# Patient Record
Sex: MALE | Race: WHITE | NOT HISPANIC OR LATINO | Employment: FULL TIME | URBAN - NONMETROPOLITAN AREA
[De-identification: names, ages, dates, MRNs, and addresses within clinical notes are randomized per-mention and may not be internally consistent; named-entity substitution may affect disease eponyms.]

---
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
## 2022-07-20 ENCOUNTER — OFFICE VISIT (OUTPATIENT)
Dept: INTERNAL MEDICINE | Facility: CLINIC | Age: 22
End: 2022-07-20

## 2022-07-20 VITALS
BODY MASS INDEX: 26.94 KG/M2 | SYSTOLIC BLOOD PRESSURE: 100 MMHG | DIASTOLIC BLOOD PRESSURE: 74 MMHG | OXYGEN SATURATION: 98 % | HEART RATE: 68 BPM | WEIGHT: 192.4 LBS | HEIGHT: 71 IN | RESPIRATION RATE: 18 BRPM | TEMPERATURE: 98.7 F

## 2022-07-20 DIAGNOSIS — F39 MOOD DISORDER: Primary | ICD-10-CM

## 2022-07-20 DIAGNOSIS — R53.83 FATIGUE, UNSPECIFIED TYPE: ICD-10-CM

## 2022-07-20 DIAGNOSIS — Z91.89 ENCOUNTER FOR HEPATITIS C VIRUS SCREENING TEST FOR HIGH RISK PATIENT: ICD-10-CM

## 2022-07-20 DIAGNOSIS — B35.1 NAIL FUNGUS: ICD-10-CM

## 2022-07-20 DIAGNOSIS — Z11.59 ENCOUNTER FOR HEPATITIS C VIRUS SCREENING TEST FOR HIGH RISK PATIENT: ICD-10-CM

## 2022-07-20 DIAGNOSIS — Z13.220 SCREENING FOR HYPERLIPIDEMIA: ICD-10-CM

## 2022-07-20 PROCEDURE — 99204 OFFICE O/P NEW MOD 45 MIN: CPT

## 2022-07-20 RX ORDER — LAMOTRIGINE 100 MG/1
100 TABLET ORAL DAILY
Qty: 90 TABLET | Refills: 0 | Status: SHIPPED | OUTPATIENT
Start: 2022-07-20

## 2022-07-20 RX ORDER — FLUCONAZOLE 150 MG/1
150 TABLET ORAL DAILY
Qty: 2 TABLET | Refills: 0 | Status: SHIPPED | OUTPATIENT
Start: 2022-07-20

## 2022-07-20 RX ORDER — CLOTRIMAZOLE 1 G/ML
SOLUTION TOPICAL 2 TIMES DAILY
Qty: 15 ML | Refills: 1 | Status: SHIPPED | OUTPATIENT
Start: 2022-07-20

## 2022-07-20 NOTE — PROGRESS NOTES
"        Subjective     Chief Complaint   Patient presents with   • Depression     Establishing care. No previous medication. Interested in Adyenight.    • Nail Problem     Right great toe, right pinky toe. Left pinky toe.        History of Present Illness  Patient is a 22 year old male who presents today with complaints of depression. States he is significant struggling with depression. States he is either really high or really low. Reports that sometimes his decision making is impulsive. States \"every little thing will send him off.\" States has not been on any medications in the past for this issue, but his sister, whom is present with him during exam, advised that his whole family has discussed that they feel this is something that would be very beneficial to patient, and patient agrees with this.  Reports he does struggle with chronic fatigue, and has not had labs done in quite some time. Denies any known medical history.   Reports has chronic athletes foot and what he has been told is nail fungus.  Has been treated in the past, and states that it has not helped and was told that he would probably have to have his right great toenail removed.  He states that he is trying to avoid and would like to try medical management again.  States the nail fungus is on his right and right great toe, right pinky toe, and left pinky toe.  Denies any pain or issues with this, however, states it is starting to just be more irritating that he is unable to [facility].      Patient's PMR from outside medical facility reviewed and noted.    Review of Systems   Constitutional: Negative for activity change, chills, fatigue and unexpected weight change.   HENT: Negative for congestion, ear pain, mouth sores, sore throat and trouble swallowing.    Eyes: Negative for discharge and visual disturbance.   Respiratory: Negative for cough and shortness of breath.    Cardiovascular: Negative for chest pain and leg swelling.   Gastrointestinal: " Negative for abdominal pain, constipation, diarrhea and nausea.   Genitourinary: Negative for decreased urine volume, difficulty urinating, flank pain and hematuria.   Musculoskeletal: Negative for back pain and gait problem.   Skin: Negative for color change and rash.   Allergic/Immunologic: Negative for environmental allergies and immunocompromised state.   Neurological: Negative for speech difficulty, weakness and headaches.   Psychiatric/Behavioral: Positive for agitation and dysphoric mood. Negative for confusion and sleep disturbance. The patient is nervous/anxious.         Otherwise complete ROS reviewed and negative except as mentioned in the HPI.    Past Medical History: History reviewed. No pertinent past medical history.  Past Surgical History:  Past Surgical History:   Procedure Laterality Date   • ADENOIDECTOMY     • SHOULDER ACROMIOPLASTY Left    • TONSILLECTOMY       Social History:  reports that he has never smoked. He has never used smokeless tobacco. He reports that he does not drink alcohol and does not use drugs.    Family History: family history includes Cancer in his paternal grandmother.      Allergies:  No Known Allergies  Medications:  Prior to Admission medications    Medication Sig Start Date End Date Taking? Authorizing Provider   ofloxacin (Ocuflox) 0.3 % ophthalmic solution Administer 1 drop to the right eye 4 (Four) Times a Day. For five days 8/26/20 7/20/22  Panda Brown MD       MANUEL:        PHQ-9 Depression Screening  Little interest or pleasure in doing things? 1-->several days   Feeling down, depressed, or hopeless? 3-->nearly every day   Trouble falling or staying asleep, or sleeping too much? 3-->nearly every day   Feeling tired or having little energy? 3-->nearly every day   Poor appetite or overeating? 1-->several days   Feeling bad about yourself - or that you are a failure or have let yourself or your family down? 2-->more than half the days   Trouble concentrating  "on things, such as reading the newspaper or watching television? 1-->several days   Moving or speaking so slowly that other people could have noticed? Or the opposite - being so fidgety or restless that you have been moving around a lot more than usual? 1-->several days   Thoughts that you would be better off dead, or of hurting yourself in some way? 0-->not at all   PHQ-9 Total Score 15   If you checked off any problems, how difficult have these problems made it for you to do your work, take care of things at home, or get along with other people? very difficult       PHQ-9 Total Score: 15   15-19 (Moderately Severe Depression)  Recommended starting psychotherapy/counseling, Discussed antidepressant therapy and gene sight testing    Objective     Vital Signs: /74 (BP Location: Left arm, Patient Position: Sitting, Cuff Size: Adult)   Pulse 68   Temp 98.7 °F (37.1 °C) (Skin)   Resp 18   Ht 180.3 cm (71\")   Wt 87.3 kg (192 lb 6.4 oz)   SpO2 98%   BMI 26.83 kg/m²   Physical Exam  Constitutional:       General: He is not in acute distress.     Appearance: Normal appearance. He is normal weight. He is not ill-appearing.   HENT:      Head: Normocephalic and atraumatic.      Nose: Nose normal.      Mouth/Throat:      Mouth: Mucous membranes are moist.      Pharynx: No posterior oropharyngeal erythema.   Eyes:      General: No scleral icterus.     Extraocular Movements: Extraocular movements intact.      Conjunctiva/sclera: Conjunctivae normal.      Pupils: Pupils are equal, round, and reactive to light.   Cardiovascular:      Rate and Rhythm: Normal rate and regular rhythm.      Pulses: Normal pulses.      Heart sounds: Normal heart sounds.   Pulmonary:      Effort: Pulmonary effort is normal. No respiratory distress.      Breath sounds: Normal breath sounds. No wheezing.   Abdominal:      General: Abdomen is flat. Bowel sounds are normal.      Palpations: Abdomen is soft.      Tenderness: There is no abdominal " tenderness.   Musculoskeletal:         General: Normal range of motion.      Cervical back: Normal range of motion.      Right lower leg: No edema.      Left lower leg: No edema.        Feet:    Feet:      Right foot:      Toenail Condition: Fungal disease present.     Left foot:      Toenail Condition: Fungal disease present.  Skin:     General: Skin is warm and dry.      Findings: No erythema or rash.   Neurological:      General: No focal deficit present.      Mental Status: He is alert and oriented to person, place, and time. Mental status is at baseline.      Motor: No weakness.   Psychiatric:         Mood and Affect: Mood normal.         Behavior: Behavior normal.         Thought Content: Thought content normal.         Judgment: Judgment normal.         BMI is >= 25 and <30. (Overweight) The following options were offered after discussion;: exercise counseling/recommendations and nutrition counseling/recommendations      Results Reviewed:  No results found for: GLUCOSE, BUN, CREATININE, NA, K, CL, CO2, CALCIUM, ALT, AST, WBC, HCT, PLT, CHOL, TRIG, HDL, LDL, LDLHDL, HGBA1C      Assessment / Plan     Assessment/Plan:  1. Mood disorder (HCC)  - lamoTRIgine (LaMICtal) 100 MG tablet; Take 1 tablet by mouth Daily.  Dispense: 90 tablet; Refill: 0  - GeneSight - Swab,    2. Fatigue, unspecified typ  - CBC & Differential  - Comprehensive metabolic panel  - Testosterone  - TSH  - T4    3. Screening for hyperlipidemia  - Lipid Panel    4. encounter for hepatitis C virus screening test for high risk patient  - Hepatitis C antibody    5. Nail fungus  - clotrimazole (LOTRIMIN) 1 % external solution; Apply  topically to the appropriate area as directed 2 (Two) Times a Day.  Dispense: 15 mL; Refill: 1  - fluconazole (Diflucan) 150 MG tablet; Take 1 tablet by mouth Daily.  Dispense: 2 tablet; Refill: 0        Return in about 2 weeks (around 8/3/2022) for gene sight. unless patient needs to be seen sooner or acute issues  arise.      I have discussed the patient results/orders and and plan/recommendation with them at today's visit.      Anne Schaeffer, APRN   07/20/2022

## 2022-07-22 ENCOUNTER — TELEPHONE (OUTPATIENT)
Dept: INTERNAL MEDICINE | Facility: CLINIC | Age: 22
End: 2022-07-22

## 2022-07-22 LAB
ALBUMIN SERPL-MCNC: 5 G/DL (ref 4.1–5.2)
ALBUMIN/GLOB SERPL: 2.5 {RATIO} (ref 1.2–2.2)
ALP SERPL-CCNC: 85 IU/L (ref 44–121)
ALT SERPL-CCNC: 13 IU/L (ref 0–44)
AST SERPL-CCNC: 17 IU/L (ref 0–40)
BASOPHILS # BLD AUTO: 0.1 X10E3/UL (ref 0–0.2)
BASOPHILS NFR BLD AUTO: 1 %
BILIRUB SERPL-MCNC: 0.7 MG/DL (ref 0–1.2)
BUN SERPL-MCNC: 12 MG/DL (ref 6–20)
BUN/CREAT SERPL: 11 (ref 9–20)
CALCIUM SERPL-MCNC: 9.8 MG/DL (ref 8.7–10.2)
CHLORIDE SERPL-SCNC: 104 MMOL/L (ref 96–106)
CHOLEST SERPL-MCNC: 133 MG/DL (ref 100–199)
CO2 SERPL-SCNC: 22 MMOL/L (ref 20–29)
CREAT SERPL-MCNC: 1.13 MG/DL (ref 0.76–1.27)
EGFRCR SERPLBLD CKD-EPI 2021: 95 ML/MIN/1.73
EOSINOPHIL # BLD AUTO: 0.1 X10E3/UL (ref 0–0.4)
EOSINOPHIL NFR BLD AUTO: 2 %
ERYTHROCYTE [DISTWIDTH] IN BLOOD BY AUTOMATED COUNT: 13 % (ref 11.6–15.4)
GLOBULIN SER CALC-MCNC: 2 G/DL (ref 1.5–4.5)
GLUCOSE SERPL-MCNC: 85 MG/DL (ref 65–99)
HCT VFR BLD AUTO: 50.2 % (ref 37.5–51)
HCV AB S/CO SERPL IA: <0.1 S/CO RATIO (ref 0–0.9)
HDLC SERPL-MCNC: 45 MG/DL
HGB BLD-MCNC: 16.4 G/DL (ref 13–17.7)
IMM GRANULOCYTES # BLD AUTO: 0 X10E3/UL (ref 0–0.1)
IMM GRANULOCYTES NFR BLD AUTO: 0 %
LDLC SERPL CALC-MCNC: 76 MG/DL (ref 0–99)
LYMPHOCYTES # BLD AUTO: 2 X10E3/UL (ref 0.7–3.1)
LYMPHOCYTES NFR BLD AUTO: 38 %
MCH RBC QN AUTO: 30.4 PG (ref 26.6–33)
MCHC RBC AUTO-ENTMCNC: 32.7 G/DL (ref 31.5–35.7)
MCV RBC AUTO: 93 FL (ref 79–97)
MONOCYTES # BLD AUTO: 0.5 X10E3/UL (ref 0.1–0.9)
MONOCYTES NFR BLD AUTO: 10 %
NEUTROPHILS # BLD AUTO: 2.6 X10E3/UL (ref 1.4–7)
NEUTROPHILS NFR BLD AUTO: 49 %
PLATELET # BLD AUTO: 250 X10E3/UL (ref 150–450)
POTASSIUM SERPL-SCNC: 4.3 MMOL/L (ref 3.5–5.2)
PROT SERPL-MCNC: 7 G/DL (ref 6–8.5)
RBC # BLD AUTO: 5.39 X10E6/UL (ref 4.14–5.8)
SODIUM SERPL-SCNC: 141 MMOL/L (ref 134–144)
T4 SERPL-MCNC: 7.3 UG/DL (ref 4.5–12)
TESTOST SERPL-MCNC: 609 NG/DL (ref 264–916)
TRIGL SERPL-MCNC: 53 MG/DL (ref 0–149)
TSH SERPL DL<=0.005 MIU/L-ACNC: 1.29 UIU/ML (ref 0.45–4.5)
VLDLC SERPL CALC-MCNC: 12 MG/DL (ref 5–40)
WBC # BLD AUTO: 5.4 X10E3/UL (ref 3.4–10.8)

## 2022-07-22 NOTE — TELEPHONE ENCOUNTER
Called patient discussed lab work.  No answer left voicemail stating that labs are within normal limits and if he had any questions or concerns to please return phone call to office.

## 2024-03-22 ENCOUNTER — TRANSCRIBE ORDERS (OUTPATIENT)
Dept: ADMINISTRATIVE | Facility: HOSPITAL | Age: 24
End: 2024-03-22
Payer: COMMERCIAL

## 2024-03-22 ENCOUNTER — LAB (OUTPATIENT)
Dept: LAB | Facility: HOSPITAL | Age: 24
End: 2024-03-22
Payer: COMMERCIAL

## 2024-03-22 DIAGNOSIS — N46.9 INFERTILITY MALE: Primary | ICD-10-CM

## 2024-03-22 DIAGNOSIS — N46.9 INFERTILITY MALE: ICD-10-CM

## 2024-03-22 LAB
CHARACTER SMN: NORMAL
COLLECTION METHOD SMN: ABNORMAL
EPI CELLS #/AREA SMN HPF: ABNORMAL /HPF
LIQUEFACTION TIME SMN: ABNORMAL MIN
NON PROGRESSIVE MOTILITY: 13.2 %
RBC #/AREA SMN HPF: ABNORMAL /HPF
SEX ABSTIN DURATION TIME PATIENT: 2 DAYS
SLOW PROGRESSIVE MOTILITY: 3.4 %
SMI: 18 (ref 80–400)
SPEC CONTAINER SPEC: ABNORMAL
SPECIMEN VOL SMN: 2 ML
SPERM # SMN: 37.9 MILLIONS/ML
SPERM IMMOTILE NFR SMN: 81.3 %
SPERM MOTILE NFR SMN: 18.7 % MOTILE (ref 50–100)
SPERM PROG NFR SMN: 2.1 % (ref 25–100)
SPERM SMN: 2.9 % NORMAL (ref 15–100)
VISC SMN: NORMAL CP
WBC SMN QL: ABNORMAL /HPF
WHO STANDARD: ABNORMAL

## 2024-03-22 PROCEDURE — 89320 SEMEN ANAL VOL/COUNT/MOT: CPT

## 2024-09-25 ENCOUNTER — APPOINTMENT (OUTPATIENT)
Dept: CT IMAGING | Facility: HOSPITAL | Age: 24
End: 2024-09-25
Payer: COMMERCIAL

## 2024-09-25 ENCOUNTER — HOSPITAL ENCOUNTER (EMERGENCY)
Facility: HOSPITAL | Age: 24
Discharge: HOME | End: 2024-09-25
Admitting: EMERGENCY MEDICINE
Payer: COMMERCIAL

## 2024-09-25 VITALS
SYSTOLIC BLOOD PRESSURE: 128 MMHG | HEART RATE: 69 BPM | OXYGEN SATURATION: 98 % | HEIGHT: 71 IN | TEMPERATURE: 97.9 F | WEIGHT: 222 LBS | BODY MASS INDEX: 31.08 KG/M2 | RESPIRATION RATE: 18 BRPM | DIASTOLIC BLOOD PRESSURE: 79 MMHG

## 2024-09-25 DIAGNOSIS — R68.84 JAW PAIN: Primary | ICD-10-CM

## 2024-09-25 PROCEDURE — 70486 CT MAXILLOFACIAL W/O DYE: CPT

## 2024-09-25 PROCEDURE — 96372 THER/PROPH/DIAG INJ SC/IM: CPT

## 2024-09-25 PROCEDURE — 25010000002 KETOROLAC TROMETHAMINE PER 15 MG: Performed by: PHYSICIAN ASSISTANT

## 2024-09-25 PROCEDURE — 99284 EMERGENCY DEPT VISIT MOD MDM: CPT

## 2024-09-25 RX ORDER — KETOROLAC TROMETHAMINE 30 MG/ML
30 INJECTION, SOLUTION INTRAMUSCULAR; INTRAVENOUS ONCE
Status: COMPLETED | OUTPATIENT
Start: 2024-09-25 | End: 2024-09-25

## 2024-09-25 RX ORDER — METHOCARBAMOL 750 MG/1
1500 TABLET, FILM COATED ORAL ONCE
Status: COMPLETED | OUTPATIENT
Start: 2024-09-25 | End: 2024-09-25

## 2024-09-25 RX ORDER — KETOROLAC TROMETHAMINE 10 MG/1
10 TABLET, FILM COATED ORAL EVERY 6 HOURS PRN
Qty: 12 TABLET | Refills: 0 | Status: SHIPPED | OUTPATIENT
Start: 2024-09-25

## 2024-09-25 RX ADMIN — KETOROLAC TROMETHAMINE 30 MG: 30 INJECTION, SOLUTION INTRAMUSCULAR; INTRAVENOUS at 11:12

## 2024-09-25 RX ADMIN — METHOCARBAMOL TABLETS 1500 MG: 750 TABLET, COATED ORAL at 11:12

## 2024-09-25 NOTE — Clinical Note
[facility] EMERGENCY DEPARTMENT  [address]  [facility] [address]  Phone: [phone]    Edi Melo was seen and treated in our emergency department on 9/25/2024.  He may return to work on 09/30/2024.         Thank you for choosing [facility].    Jagdish Verdin PA-C

## 2024-09-25 NOTE — ED PROVIDER NOTES
Subjective   History of Present Illness    Patient is a 24-year-old male presenting to ED with jaw pain and stiffness. PMH significant for tonsillectomy, adenoidectomy.  Patient states approximately 11 days ago he woke up with right sided jaw pain, mild swelling, and inability to fully open the right side of his jaw.  Patient states that in the days leading up he had had no injuries, had not eaten any particularly tough food, had no problems chewing or any dental problems.  Patient states that since that day his symptoms have progressively worsened to the point that today it was the least amount he could open his jaw and the most swelling on the right side of his face.  Patient tried applying ice this morning for which the swelling is improving how ever he still cannot fully open the jaw.  Patient denies fevers, chills, diaphoresis, drooling, difficulty swallowing, left-sided pain.  Patient denies any known history of TMJ.  Patient states he has had his right sided wisdom teeth as well as a right upper molar removed for an unknown reason but otherwise denies any oral problems.  Patient denies any history of jaw fractures.  Patient denies use of any medications prior to arrival and presents at this time for further evaluation.    Records reviewed show patient last seen the ED on 10/8/2021 for puncture wound of right thigh.    Patient last seen outpatient send the PCP office on 7/14/2023 for acute pain of left shoulder.    Review of Systems   Constitutional: Negative.  Negative for chills, diaphoresis and fever.   HENT:  Positive for facial swelling (right jaw). Negative for dental problem, drooling, ear discharge, ear pain, mouth sores, sinus pressure, sinus pain, sore throat, tinnitus and trouble swallowing.         Reports + right jaw pain   Eyes: Negative.    Respiratory: Negative.     Cardiovascular: Negative.    Gastrointestinal: Negative.  Negative for nausea and vomiting.   Genitourinary: Negative.     Musculoskeletal:  Negative for neck pain and neck stiffness.   Skin: Negative.  Negative for rash and wound.   Allergic/Immunologic: Negative for immunocompromised state.   Neurological: Negative.  Negative for headaches.   Psychiatric/Behavioral: Negative.     All other systems reviewed and are negative.      Past Medical History:   Diagnosis Date    History of medical problems 2018    1st shoulder surgery and estrellita had 2 more since then       No Known Allergies    Past Surgical History:   Procedure Laterality Date    ADENOIDECTOMY      SHOULDER ACROMIOPLASTY Left     TONSILLECTOMY         Family History   Problem Relation Age of Onset    Cancer Paternal Grandmother        Social History     Socioeconomic History    Marital status: Single   Tobacco Use    Smoking status: Never    Smokeless tobacco: Never   Vaping Use    Vaping status: Former    Quit date: 5/30/2022    Substances: Nicotine   Substance and Sexual Activity    Alcohol use: Yes     Alcohol/week: 3.0 standard drinks of alcohol     Types: 3 Cans of beer per week    Drug use: No    Sexual activity: Yes     Partners: Female     Birth control/protection: None           Objective   Physical Exam  Vitals and nursing note reviewed.   Constitutional:       General: He is not in acute distress.     Appearance: Normal appearance. He is well-developed, well-groomed and overweight. He is not ill-appearing, toxic-appearing or diaphoretic.   HENT:      Head: Normocephalic.      Jaw: Swelling (right sided) and pain on movement (right sided) present. No trismus, tenderness or malocclusion.      Right Ear: Tympanic membrane, ear canal and external ear normal. No mastoid tenderness.      Left Ear: Tympanic membrane, ear canal and external ear normal. No mastoid tenderness.      Nose:      Right Sinus: No maxillary sinus tenderness or frontal sinus tenderness.      Left Sinus: No maxillary sinus tenderness or frontal sinus tenderness.      Mouth/Throat:      Mouth: Mucous  membranes are moist.      Pharynx: Oropharynx is clear.      Comments: No intraoral rashes, lesions, petechia   No dental caries   Normal inspection roof and floor of mouth including no evidence alessandro angina   No pooling or drooling of secretions.  Eyes:      Conjunctiva/sclera: Conjunctivae normal.      Pupils: Pupils are equal, round, and reactive to light.   Cardiovascular:      Rate and Rhythm: Normal rate and regular rhythm.   Pulmonary:      Effort: Pulmonary effort is normal.      Breath sounds: Normal breath sounds. No stridor. No wheezing.   Musculoskeletal:         General: Normal range of motion.      Cervical back: Normal range of motion and neck supple. No rigidity or tenderness.   Skin:     General: Skin is warm and dry.      Findings: No rash.   Neurological:      Mental Status: He is alert and oriented to person, place, and time.      Gait: Gait normal.   Psychiatric:         Mood and Affect: Mood normal.         Behavior: Behavior normal. Behavior is cooperative.         Procedures           ED Course                                             Medical Decision Making  Problems Addressed:  Jaw pain: complicated acute illness or injury    Amount and/or Complexity of Data Reviewed  Independent Historian:      Details: Significant other   External Data Reviewed: labs, radiology and notes.  Radiology: ordered. Decision-making details documented in ED Course.  ECG/medicine tests: ordered. Decision-making details documented in ED Course.    Risk  Prescription drug management.      Patient is a 24-year-old male presenting to ED with jaw pain and stiffness. PMH significant for tonsillectomy, adenoidectomy.  Upon initial valuation patient resting comfortably in the bed in no acute distress per nontoxic-appearing, non-ill-appearing, nondiaphoretic.  Patient with unremarkable vital signs.  Examination finds tenderness, trismus and pain upon movement of the right side of the jaw with no left-sided  abnormalities.  Intraoral inspection is normal with no intraoral rashes, lesions, petechiae, no dental caries.  No inspection of roof and floor of mouth including no evidence of Mannie angina.  Neck is supple with full active range of motion and no evidence of rigidity or meningismus.  No appreciated lymphadenopathy.  Discussed with patient need for maxillofacial sinuses CT and ability to use Robaxin as well as Toradol for which he is amenable with no further questions concerns, needs at this time.    Differential diagnosis: TMJ, jaw fracture, muscle spasm, parotiditis, dental infection, other    Maxillofacial sinuses CT showed: Unremarkable noncontrast CT, no acute process identified, normal appearance of TMJ with no inflammatory changes identified in the  spaces.  Patient was given a dose of Toradol and Robaxin and reported significant improvement in his pain and discomfort for which she had ability to open his jaw further.  Patient was able to swallow without difficulty including no pooling or drooling of his secretions.   Discussed with patient need for continued application of heat, appropriate of anti-inflammatories and muscle relaxers.  Advised PCP follow-up within the next 48 hours for close reevaluation.  Reviewed strict return precautions need for immediate return to ED should he develop any new or worsening symptoms.  Patient is tolerating p.o. fluids without difficulty, remains hemodynamically stable.  Patient is very appreciative with no further questions, concerns, needs at this time and is stable for discharge.      Final diagnoses:   Jaw pain       ED Disposition  ED Disposition       ED Disposition   Discharge    Condition   Stable    Comment   --               Anne Schaeffer, APRMERCY  [address]  [place] [address]  [phone]    Schedule an appointment as soon as possible for a visit in 2 days      [facility] EMERGENCY DEPARTMENT  [address] [place] [address]  [id number] [phone]    As needed         Medication List        New Prescriptions      ketorolac 10 MG tablet  Commonly known as: TORADOL  Take 1 tablet by mouth Every 6 (Six) Hours As Needed for Mild Pain.     tiZANidine 4 MG tablet  Commonly known as: Zanaflex  Take 1 tablet by mouth Every 6 (Six) Hours As Needed for Muscle Spasms.               Where to Get Your Medications        These medications were sent to [facility]/pharmacy #[id number] - [place], [place] - [address] [place] [address] [phone] [facility] [phone] [address] [address]      Phone: [phone]   ketorolac 10 MG tablet  tiZANidine 4 MG tablet            Jagdish Verdin PA-C  09/25/24 1153

## 2024-09-25 NOTE — DISCHARGE INSTRUCTIONS
Today your imaging is well appearing.   Please apply heat and ice in alteration to your jaw.   Please use the toradol for pain and zanaflex for muscle relaxer.   Please follow up with your primary care provider alainain 48 hours for close reevaluation.  Should you develop any new or worsening symptoms please return to the ER for further evaluation.

## 2025-02-10 PROCEDURE — 87636 SARSCOV2 & INF A&B AMP PRB: CPT

## 2025-02-10 PROCEDURE — 87081 CULTURE SCREEN ONLY: CPT
